# Patient Record
Sex: FEMALE | Race: OTHER | HISPANIC OR LATINO
[De-identification: names, ages, dates, MRNs, and addresses within clinical notes are randomized per-mention and may not be internally consistent; named-entity substitution may affect disease eponyms.]

---

## 2023-10-10 PROBLEM — Z00.00 ENCOUNTER FOR PREVENTIVE HEALTH EXAMINATION: Status: ACTIVE | Noted: 2023-10-10

## 2023-10-16 ENCOUNTER — APPOINTMENT (OUTPATIENT)
Dept: COLORECTAL SURGERY | Facility: CLINIC | Age: 58
End: 2023-10-16
Payer: COMMERCIAL

## 2023-10-16 VITALS
DIASTOLIC BLOOD PRESSURE: 77 MMHG | SYSTOLIC BLOOD PRESSURE: 133 MMHG | BODY MASS INDEX: 26.58 KG/M2 | WEIGHT: 150 LBS | TEMPERATURE: 98.2 F | HEART RATE: 62 BPM | RESPIRATION RATE: 16 BRPM | HEIGHT: 63 IN | OXYGEN SATURATION: 99 %

## 2023-10-16 DIAGNOSIS — Z78.9 OTHER SPECIFIED HEALTH STATUS: ICD-10-CM

## 2023-10-16 DIAGNOSIS — Z82.49 FAMILY HISTORY OF ISCHEMIC HEART DISEASE AND OTHER DISEASES OF THE CIRCULATORY SYSTEM: ICD-10-CM

## 2023-10-16 DIAGNOSIS — Z83.79 FAMILY HISTORY OF OTHER DISEASES OF THE DIGESTIVE SYSTEM: ICD-10-CM

## 2023-10-16 DIAGNOSIS — K61.1 RECTAL ABSCESS: ICD-10-CM

## 2023-10-16 PROCEDURE — 46600 DIAGNOSTIC ANOSCOPY SPX: CPT

## 2023-10-16 PROCEDURE — 99204 OFFICE O/P NEW MOD 45 MIN: CPT | Mod: 25

## 2023-10-16 RX ORDER — BACILLUS COAGULANS/INULIN 1B-250 MG
CAPSULE ORAL
Refills: 0 | Status: ACTIVE | COMMUNITY

## 2023-10-16 RX ORDER — DOCUSATE CALCIUM 240 MG
CAPSULE ORAL
Refills: 0 | Status: ACTIVE | COMMUNITY

## 2023-10-23 ENCOUNTER — APPOINTMENT (OUTPATIENT)
Dept: MRI IMAGING | Facility: CLINIC | Age: 58
End: 2023-10-23

## 2023-10-23 ENCOUNTER — OUTPATIENT (OUTPATIENT)
Dept: OUTPATIENT SERVICES | Facility: HOSPITAL | Age: 58
LOS: 1 days | End: 2023-10-23
Payer: COMMERCIAL

## 2023-10-23 ENCOUNTER — RESULT REVIEW (OUTPATIENT)
Age: 58
End: 2023-10-23

## 2023-10-23 PROCEDURE — 72197 MRI PELVIS W/O & W/DYE: CPT | Mod: 26

## 2023-10-24 ENCOUNTER — NON-APPOINTMENT (OUTPATIENT)
Age: 58
End: 2023-10-24

## 2023-11-06 ENCOUNTER — APPOINTMENT (OUTPATIENT)
Dept: COLORECTAL SURGERY | Facility: CLINIC | Age: 58
End: 2023-11-06
Payer: COMMERCIAL

## 2023-11-06 VITALS
RESPIRATION RATE: 16 BRPM | DIASTOLIC BLOOD PRESSURE: 78 MMHG | SYSTOLIC BLOOD PRESSURE: 129 MMHG | OXYGEN SATURATION: 95 % | WEIGHT: 150 LBS | HEIGHT: 63 IN | HEART RATE: 70 BPM | BODY MASS INDEX: 26.58 KG/M2

## 2023-11-06 PROCEDURE — 99213 OFFICE O/P EST LOW 20 MIN: CPT

## 2023-11-29 NOTE — ASU PATIENT PROFILE, ADULT - NSICDXPASTSURGICALHX_GEN_ALL_CORE_FT
PAST SURGICAL HISTORY:  No significant past surgical history PAST SURGICAL HISTORY:  H/O: hysterectomy     History of appendectomy

## 2023-11-29 NOTE — ASU PATIENT PROFILE, ADULT - NS PREOP UNDERSTANDS INFO
Spoke to patient's  , have  your wife to be NPO/NO solid foods after  2100 pm   Thursday   night,  allow to drink water or apple juice till 12Mn,  fallow MD instructions , dress comfortable, leave all valuable at home,  no jewelry, no lotions,  bring ID photo and insurance cards,  escort arranged,  address and telephone given to patient/yes

## 2023-11-30 ENCOUNTER — TRANSCRIPTION ENCOUNTER (OUTPATIENT)
Age: 58
End: 2023-11-30

## 2023-12-01 ENCOUNTER — OUTPATIENT (OUTPATIENT)
Dept: OUTPATIENT SERVICES | Facility: HOSPITAL | Age: 58
LOS: 1 days | Discharge: HOME | End: 2023-12-01
Payer: COMMERCIAL

## 2023-12-01 ENCOUNTER — RESULT REVIEW (OUTPATIENT)
Age: 58
End: 2023-12-01

## 2023-12-01 ENCOUNTER — TRANSCRIPTION ENCOUNTER (OUTPATIENT)
Age: 58
End: 2023-12-01

## 2023-12-01 ENCOUNTER — APPOINTMENT (OUTPATIENT)
Dept: COLORECTAL SURGERY | Facility: CLINIC | Age: 58
End: 2023-12-01

## 2023-12-01 VITALS
TEMPERATURE: 97 F | RESPIRATION RATE: 16 BRPM | OXYGEN SATURATION: 97 % | HEART RATE: 77 BPM | DIASTOLIC BLOOD PRESSURE: 65 MMHG | SYSTOLIC BLOOD PRESSURE: 106 MMHG

## 2023-12-01 VITALS
WEIGHT: 154.32 LBS | TEMPERATURE: 97 F | DIASTOLIC BLOOD PRESSURE: 80 MMHG | SYSTOLIC BLOOD PRESSURE: 119 MMHG | HEART RATE: 72 BPM | HEIGHT: 63 IN | OXYGEN SATURATION: 97 % | RESPIRATION RATE: 14 BRPM

## 2023-12-01 DIAGNOSIS — Z90.49 ACQUIRED ABSENCE OF OTHER SPECIFIED PARTS OF DIGESTIVE TRACT: Chronic | ICD-10-CM

## 2023-12-01 DIAGNOSIS — Z90.710 ACQUIRED ABSENCE OF BOTH CERVIX AND UTERUS: Chronic | ICD-10-CM

## 2023-12-01 DIAGNOSIS — G89.18 OTHER ACUTE POSTPROCEDURAL PAIN: ICD-10-CM

## 2023-12-01 PROCEDURE — 88304 TISSUE EXAM BY PATHOLOGIST: CPT | Mod: 26

## 2023-12-01 PROCEDURE — 46275 REMOVE ANAL FIST INTER: CPT | Mod: GC

## 2023-12-01 DEVICE — SURGICEL 4 X 8": Type: IMPLANTABLE DEVICE | Status: FUNCTIONAL

## 2023-12-01 RX ORDER — SODIUM CHLORIDE 9 MG/ML
500 INJECTION, SOLUTION INTRAVENOUS ONCE
Refills: 0 | Status: COMPLETED | OUTPATIENT
Start: 2023-12-01 | End: 2023-12-01

## 2023-12-01 RX ORDER — ACETAMINOPHEN 500 MG
1000 TABLET ORAL ONCE
Refills: 0 | Status: DISCONTINUED | OUTPATIENT
Start: 2023-12-01 | End: 2023-12-01

## 2023-12-01 RX ORDER — DOCUSATE SODIUM 100 MG/1
100 CAPSULE ORAL 3 TIMES DAILY
Qty: 90 | Refills: 1 | Status: ACTIVE | COMMUNITY
Start: 2023-12-01 | End: 1900-01-01

## 2023-12-01 RX ORDER — SODIUM CHLORIDE 9 MG/ML
1000 INJECTION, SOLUTION INTRAVENOUS
Refills: 0 | Status: DISCONTINUED | OUTPATIENT
Start: 2023-12-01 | End: 2023-12-01

## 2023-12-01 RX ORDER — ONDANSETRON 8 MG/1
4 TABLET, FILM COATED ORAL ONCE
Refills: 0 | Status: DISCONTINUED | OUTPATIENT
Start: 2023-12-01 | End: 2023-12-01

## 2023-12-01 RX ORDER — KETOROLAC TROMETHAMINE 30 MG/ML
15 SYRINGE (ML) INJECTION ONCE
Refills: 0 | Status: DISCONTINUED | OUTPATIENT
Start: 2023-12-01 | End: 2023-12-01

## 2023-12-01 RX ORDER — OXYCODONE AND ACETAMINOPHEN 5; 325 MG/1; MG/1
5-325 TABLET ORAL
Qty: 18 | Refills: 0 | Status: ACTIVE | COMMUNITY
Start: 2023-12-01 | End: 1900-01-01

## 2023-12-01 RX ADMIN — SODIUM CHLORIDE 100 MILLILITER(S): 9 INJECTION, SOLUTION INTRAVENOUS at 09:51

## 2023-12-01 RX ADMIN — SODIUM CHLORIDE 1000 MILLILITER(S): 9 INJECTION, SOLUTION INTRAVENOUS at 09:50

## 2023-12-01 NOTE — PRE-ANESTHESIA EVALUATION ADULT - NSANTHOSAYNRD_GEN_A_CORE
No. KATHARINE screening performed.  STOP BANG Legend: 0-2 = LOW Risk; 3-4 = INTERMEDIATE Risk; 5-8 = HIGH Risk

## 2023-12-01 NOTE — ASU DISCHARGE PLAN (ADULT/PEDIATRIC) - ASU DC SPECIAL INSTRUCTIONSFT
Please follow the post-operative instructions that you have received from the office.  You have a piece of guaze-like material placed into the anal canal, do not be alarmed if you see passage of this in your next bowel movement.

## 2023-12-01 NOTE — ASU DISCHARGE PLAN (ADULT/PEDIATRIC) - CARE PROVIDER_API CALL
Mary De La Vega  Surgery  [facility]  [place] [address]  Phone: [phone]  Fax: [phone]  Follow Up Time: 1 month

## 2023-12-01 NOTE — BRIEF OPERATIVE NOTE - OPERATION/FINDINGS
Patient was prepped and draped in sterile fashion. Local anesthetic was administered and anal canal was examined. Fistula probe was inserted into external opening of fistula and followed down to internal open within anal canal. Posterior midline fistula tract was incised using a scalpel and electrocautery was used to dissect down to fistula tract. Tract was then curettaged and sent for specimen. Area was irrigated and adequate hemostasis was achieved. Surgical was placed in wound bed. Dressing consisted of guaze, abdominal pad, and mesh underwear. Patient tolerated procedure well and was sent to recovery room. Patient was prepped and draped in sterile fashion. Local anesthetic was administered and anal canal was examined. Fistula probe was inserted into external opening of fistula and followed down to internal open within anal canal. Left posterior fistula tract was incised using a scalpel and electrocautery was used to dissect through the fistula tract. Tract was then curettaged and sent for specimen. Area was irrigated and adequate hemostasis was achieved. Surgicel was placed in wound bed. Dressing consisted of guaze, abdominal pad, and mesh underwear. Patient tolerated procedure well and was sent to recovery room.

## 2023-12-01 NOTE — ASU DISCHARGE PLAN (ADULT/PEDIATRIC) - NS MD DC FALL RISK RISK
For information on Fall & Injury Prevention, visit: [email or web address] OR  [email or web address] OR  https://www.cdc.gov/steadi/patient.html

## 2023-12-01 NOTE — BRIEF OPERATIVE NOTE - NSICDXBRIEFPROCEDURE_GEN_ALL_CORE_FT
PROCEDURES:  Exam under anesthesia, rectum, with fistulotomy, hemorrhoidectomy, or anal sphincterotomy, or any combination if indicated 01-Dec-2023 08:28:39  Karmen Stanley

## 2023-12-12 LAB
SURGICAL PATHOLOGY STUDY: SIGNIFICANT CHANGE UP
SURGICAL PATHOLOGY STUDY: SIGNIFICANT CHANGE UP

## 2023-12-14 ENCOUNTER — NON-APPOINTMENT (OUTPATIENT)
Age: 58
End: 2023-12-14

## 2023-12-28 ENCOUNTER — APPOINTMENT (OUTPATIENT)
Dept: COLORECTAL SURGERY | Facility: CLINIC | Age: 58
End: 2023-12-28
Payer: COMMERCIAL

## 2023-12-28 VITALS
SYSTOLIC BLOOD PRESSURE: 121 MMHG | HEART RATE: 77 BPM | DIASTOLIC BLOOD PRESSURE: 76 MMHG | BODY MASS INDEX: 27.46 KG/M2 | TEMPERATURE: 97.5 F | WEIGHT: 155 LBS | HEIGHT: 63 IN

## 2023-12-28 PROBLEM — Z78.9 OTHER SPECIFIED HEALTH STATUS: Chronic | Status: ACTIVE | Noted: 2023-11-29

## 2023-12-28 PROCEDURE — 99024 POSTOP FOLLOW-UP VISIT: CPT

## 2023-12-28 NOTE — PHYSICAL EXAM
[FreeTextEntry1] : Medical assistant present for duration of physical examination   General no acute distress, alert and oriented Psych calm, pleasant, in good spirits Nonlabored breathing Ambulating without assistance Skin normal color and pigment, no visible lesions or rashes    Anorectal Exam: Inspection no cellulitis or fluctuance, left posterior fistulotomy site superficial wound with granulation and scant mucoid staining MILDRED nontender, no masses palpated, no blood on gloved finger, no fluctuance, good tone.    Patient tolerated examination well.

## 2023-12-28 NOTE — ASSESSMENT
[FreeTextEntry1] : Recovering well, patient reassured Continue high fiber diet, adequate oral hydration, stool softeners and sitz baths. Add laxative prn if constipated. Avoid constipation and straining Over the counter pain control prn F/u in 1 month or sooner as needed All questions were answered, patient expressed understanding, and is agreeable to this plan.

## 2023-12-28 NOTE — HISTORY OF PRESENT ILLNESS
[FreeTextEntry1] : 56 yo F presents for follow up   Denies PMH Meds - stool softener daily, probiotic daily, castor oil daily. PSH hysterectomy (myoma, denies h/o cancer), perirectal abscess I&D  Seen for initial evaluation 10/16/23 Exam including anoscopy noted chronic appearing wound posteriorly near intersphincteric groove to left of midline without expressible discharge or drainage, internal hemorrhoids wtih inflammation, no discharge or drainage in anal canal.  Since last visit, outside records obtained for review and MRI pelvis completed.  s/p EUA and drainage of perianal abscess by Dr Zaheer Kidd 7/3/23 at [facility] She reported previously that since that surgery she continued to have episodes of drainage and swelling every 3-4 days.  Previously reported h/o chronic constipation, taking stool softener daily, probiotic daily, castor oil daily in order to have a daily BM.  Colonoscopy 5/3/23 by Dr Derik Recio at [facility] Impression: Hemorrhoids found on perianal exam Three 6mm polyps in cecum. Biopsied (Path - fragment of colon mucosa showing no significant abnormality. Negative for collagenous colitis, negative for microscopic colitis. mucosal Lymphoid aggregate) One 6mm polyp in rectum. Biopsied. (Path - hyperplastic polyp)  MRI Pelvis 10/23/23 IMPRESSION: Active left intersphincteric fistula. No abscess.  S/p EUA with anal fistulotomy 12/1/23  Surgical Pathology Report - Auth (Verified) Specimen(s) Submitted 1. Anal fistula Final Diagnosis 1. Anal fistula; excision: - Granulation tissue with chronic inflammation. Verified by: Willian Driver MD  Presents today for first postop visit.  Had pain initially postop for first 8 days, controlled with Percocet. Now has no anorectal pain. Daily BMs, h/o constipation, taking stool softeners since surgery.  States when she moves bowels, after she notices a "slime" when she wipes. Denies stool staining or incontinence. No urgency.  No BRBPR. No fevers.

## 2024-02-05 ENCOUNTER — APPOINTMENT (OUTPATIENT)
Dept: COLORECTAL SURGERY | Facility: CLINIC | Age: 59
End: 2024-02-05
Payer: COMMERCIAL

## 2024-02-05 VITALS
HEIGHT: 63 IN | BODY MASS INDEX: 27.46 KG/M2 | SYSTOLIC BLOOD PRESSURE: 117 MMHG | OXYGEN SATURATION: 99 % | DIASTOLIC BLOOD PRESSURE: 73 MMHG | TEMPERATURE: 98.7 F | WEIGHT: 155 LBS | RESPIRATION RATE: 16 BRPM | HEART RATE: 89 BPM

## 2024-02-05 PROCEDURE — 99024 POSTOP FOLLOW-UP VISIT: CPT

## 2024-02-05 NOTE — PHYSICAL EXAM
[FreeTextEntry1] : Medical assistant present for duration of physical examination   General no acute distress, alert and oriented Psych calm, pleasant, in good spirits Nonlabored breathing Ambulating without assistance Skin normal color and pigment, no visible lesions or rashes    Anorectal Exam: Inspection no cellulitis or fluctuance, left posterior fistulotomy site with healed scar at external portion of wound, at anal verge a superficial wound with granulation tissue is seen MILDRED nontender, no masses palpated, no blood on gloved finger  Procedure: Anoscopy  Pre procedure Diagnosis: s/p fistulotomy Post procedure Diagnosis:  s/p fistulotomy Anesthesia: none Estimated blood loss: none Specimen: none Complications: none  Consent obtained. Anoscopy was performed by passing a lighted anoscope with lubricant jelly into the anal canal and the entire anal mucosal surface was inspected. Findings included a superficial wound with granulation tissue starting at anal verge in left posterior anal canal.  Wound was gently chemically debrided using topical silver nitrate. Granulation tissue was removed until clean healthy tissue was encountered. Hemostasis achieved.  Patient tolerated examination and procedure well.

## 2024-02-05 NOTE — ASSESSMENT
[FreeTextEntry1] : Local wound care performed today with silver nitrate. Recommend calmol-4 suppositories daily. F/u 3 weeks for wound check. All questions were answered, patient expressed understanding, and is agreeable to this plan.

## 2024-02-05 NOTE — HISTORY OF PRESENT ILLNESS
[FreeTextEntry1] : 59 yo F presents for follow up  Denies PMH Meds - stool softener daily, probiotic daily, castor oil daily. PSH hysterectomy (myoma, denies h/o cancer), perirectal abscess I&D  Colonoscopy 5/3/23 by Dr Derik Recio at [facility] Impression: Hemorrhoids found on perianal exam Three 6mm polyps in cecum. Biopsied (Path - fragment of colon mucosa showing no significant abnormality. Negative for collagenous colitis, negative for microscopic colitis. mucosal Lymphoid aggregate) One 6mm polyp in rectum. Biopsied. (Path - hyperplastic polyp)  s/p EUA and drainage of perianal abscess by Dr Zaheer Kidd 7/3/23 at [facility]  Seen for initial evaluation 10/16/23 She reported previously that since that surgery she continued to have episodes of drainage and swelling every 3-4 days. Previously reported h/o chronic constipation, taking stool softener daily, probiotic daily, castor oil daily in order to have a daily BM.  MRI Pelvis 10/23/23 IMPRESSION: Active left intersphincteric fistula. No abscess.  S/p EUA with anal fistulotomy 12/1/23  Seen for first postop visit 12/28/23  Returns today for follow up. Denies pain. Reports some mucus after BM. If she does not clean well, she will having itchiness. She is applying cream from [place] called "Three Derm" to the area. BMs are daily.  No fevers.

## 2024-02-29 ENCOUNTER — APPOINTMENT (OUTPATIENT)
Dept: COLORECTAL SURGERY | Facility: CLINIC | Age: 59
End: 2024-02-29
Payer: COMMERCIAL

## 2024-02-29 VITALS
DIASTOLIC BLOOD PRESSURE: 80 MMHG | SYSTOLIC BLOOD PRESSURE: 124 MMHG | HEART RATE: 78 BPM | HEIGHT: 63 IN | BODY MASS INDEX: 27.64 KG/M2 | TEMPERATURE: 97.2 F | WEIGHT: 156 LBS

## 2024-02-29 DIAGNOSIS — K60.3 ANAL FISTULA: ICD-10-CM

## 2024-02-29 PROCEDURE — 99024 POSTOP FOLLOW-UP VISIT: CPT

## 2024-02-29 NOTE — PHYSICAL EXAM
[FreeTextEntry1] : Medical assistant present for duration of physical examination   General no acute distress, alert and oriented Psych calm, pleasant, in good spirits Nonlabored breathing Ambulating without assistance Skin normal color and pigment, no visible lesions or rashes    Anorectal Exam: Inspection no cellulitis or fluctuance, left posterior fistulotomy site with healed scar, no granulation or exudate present MILDRED nontender, no masses palpated, no blood on gloved finger  Procedure: Anoscopy  Pre procedure Diagnosis: s/p fistulotomy Post procedure Diagnosis:  s/p fistulotomy Anesthesia: none Estimated blood loss: none Specimen: none Complications: none  Consent obtained. Anoscopy was performed by passing a lighted anoscope with lubricant jelly into the anal canal and the entire anal mucosal surface was inspected. Findings included left posterior fistulotomy site with healed scar, mild hemorrhoids  Patient tolerated examination and procedure well.

## 2024-02-29 NOTE — HISTORY OF PRESENT ILLNESS
[FreeTextEntry1] : 57 yo F presents for follow up  Denies PMH Meds - stool softener daily, probiotic daily, castor oil daily. PSH hysterectomy (myoma, denies h/o cancer), perirectal abscess I&D  Colonoscopy 5/3/23 by Dr Derik Recio at [facility] Impression: Hemorrhoids found on perianal exam Three 6mm polyps in cecum. Biopsied (Path - fragment of colon mucosa showing no significant abnormality. Negative for collagenous colitis, negative for microscopic colitis. mucosal Lymphoid aggregate) One 6mm polyp in rectum. Biopsied. (Path - hyperplastic polyp)  s/p EUA and drainage of perianal abscess by Dr Zaheer Kidd 7/3/23 at [facility]  Seen for initial evaluation 10/16/23 She reported previously that since that surgery she continued to have episodes of drainage and swelling every 3-4 days. Previously reported h/o chronic constipation, taking stool softener daily, probiotic daily, castor oil daily in order to have a daily BM.  MRI Pelvis 10/23/23 IMPRESSION: Active left intersphincteric fistula. No abscess.  S/p EUA with anal fistulotomy 12/1/23  Seen for first postop visits 12/28/23 and most recently 2/5/24. Exam noted left posterior fistulotomy site with healed scar at external portion of wound and superficial wound with granulation tissue starting at anal verge in left posterior anal canal. Local wound care with silver nitrate was performed. Calmol-4 suppositories nightly were recommended.  Returns today for wound check Pt used Calmol-4 suppository for 8 days and reports some internal itching during this time. She stopped for one day then resumed it for another 8 days w/ improvement. Last used ~ a week ago. She reports occasional needle like pain w/o obvious aggravating factor. Also noticed some nontender external skin that feels "soft." Reports some mucus when she moves bowels, but denies discharge otherwise. Moves bowels once daily, denies constipation, straining or diarrhea Takes Metamucil daily and eats lots of fruit and drinking lots of water Denies ASA/NSAID use

## 2025-06-09 ENCOUNTER — NON-APPOINTMENT (OUTPATIENT)
Age: 60
End: 2025-06-09

## (undated) DEVICE — SYR LUER LOK 10CC

## (undated) DEVICE — PACK COLO RECTAL

## (undated) DEVICE — ELCTR PENCIL SMOKE EVACUATOR COATED PUSH BUTTON 70MM

## (undated) DEVICE — POSITIONER FOAM EGG CRATE ULNAR 2PCS (PINK)

## (undated) DEVICE — CATH IV INTROCAN SAFETY 16G X 1.25" (GRAY) FEP

## (undated) DEVICE — VESSEL LOOP MAXI-BLUE 0.120" X 16"

## (undated) DEVICE — SPECIMEN CONTAINER 4OZ

## (undated) DEVICE — GLV 6.5 PROTEXIS (WHITE)

## (undated) DEVICE — ELCTR STRYKER BLADE COATED INSULATED 165MM

## (undated) DEVICE — SYR ASEPTO

## (undated) DEVICE — SUT VICRYL PLUS 3-0 27" SH UNDYED

## (undated) DEVICE — SYR LUER LOK 50CC

## (undated) DEVICE — PLASTIC SOLUTION BOWL 160Z

## (undated) DEVICE — DRAPE MEDIUM SHEET 44" X 70"

## (undated) DEVICE — GOWN ROYAL SILK XL

## (undated) DEVICE — DRAPE TOWEL BLUE 17" X 24"

## (undated) DEVICE — SUT VICRYL PLUS 2-0 27" UR-6

## (undated) DEVICE — POSITIONER STRAP KNEE & BODY 3X60" DISP

## (undated) DEVICE — DRSG TELFA 3 X 8

## (undated) DEVICE — VENODYNE/SCD SLEEVE CALF MEDIUM

## (undated) DEVICE — LUBRICATING JELLY ONESHOT 1.25OZ

## (undated) DEVICE — DRSG CURITY GAUZE SPONGE 4 X 8" 12-PLY NON-STERILE

## (undated) DEVICE — PREP BETADINE KIT

## (undated) DEVICE — WARMING BLANKET UPPER ADULT

## (undated) DEVICE — TAPE SILK 3"